# Patient Record
Sex: FEMALE | Race: WHITE | NOT HISPANIC OR LATINO | URBAN - METROPOLITAN AREA
[De-identification: names, ages, dates, MRNs, and addresses within clinical notes are randomized per-mention and may not be internally consistent; named-entity substitution may affect disease eponyms.]

---

## 2025-06-27 ENCOUNTER — APPOINTMENT (OUTPATIENT)
Dept: NEUROSURGERY | Facility: CLINIC | Age: 80
End: 2025-06-27
Payer: MEDICARE

## 2025-06-27 VITALS
HEART RATE: 75 BPM | BODY MASS INDEX: 28.55 KG/M2 | WEIGHT: 136 LBS | HEIGHT: 58 IN | DIASTOLIC BLOOD PRESSURE: 84 MMHG | TEMPERATURE: 97.7 F | SYSTOLIC BLOOD PRESSURE: 126 MMHG

## 2025-06-27 DIAGNOSIS — M47.816 LUMBAR SPONDYLOSIS: Primary | ICD-10-CM

## 2025-06-27 DIAGNOSIS — M54.16 LUMBAR RADICULOPATHY: ICD-10-CM

## 2025-06-27 PROCEDURE — 1125F AMNT PAIN NOTED PAIN PRSNT: CPT | Performed by: NURSE PRACTITIONER

## 2025-06-27 PROCEDURE — 99204 OFFICE O/P NEW MOD 45 MIN: CPT | Performed by: NURSE PRACTITIONER

## 2025-06-27 ASSESSMENT — PATIENT HEALTH QUESTIONNAIRE - PHQ9
2. FEELING DOWN, DEPRESSED OR HOPELESS: NOT AT ALL
SUM OF ALL RESPONSES TO PHQ9 QUESTIONS 1 AND 2: 1
1. LITTLE INTEREST OR PLEASURE IN DOING THINGS: SEVERAL DAYS

## 2025-06-27 ASSESSMENT — ENCOUNTER SYMPTOMS: OCCASIONAL FEELINGS OF UNSTEADINESS: 1

## 2025-06-27 ASSESSMENT — PAIN SCALES - GENERAL: PAINLEVEL_OUTOF10: 6

## 2025-06-30 NOTE — PROGRESS NOTES
[facility] [place]  Department of Neurological Surgery  New Patient Visit    History of Present Illness:     Stephanie Jeffers is a 79 y.o. year old female who presents to the spine clinic with left lower extremity pain.  Patient is well-established with Dr. Pérez Ramachandran and the [facility] pain management group in August 2024 she had a left-sided L5-S1 epidural spinal injection which completely resolved her symptoms at that time.  She is continue to follow with him and is managed on tramadol and on 04/01/2025 she underwent an ablation procedure on L3-L5 which initially reduced her pain but the relief only lasted for a day therefore at the beginning of May 2025 an injection was provided to the patient that did not provide her any relief.  Imaging done in their office revealed that L4 was pushing into L5 with complete loss of L5-S1 disc space and she comes in today to discuss options.    Chief Complaint   Patient presents with    New Patient Visit     Patient is complaining of constant low back, bilateral buttock, and bilateral leg pain      14/14 systems reviewed and negative other than what is listed in the history of present illness  Problem List[1]  Medical History[2]  Surgical History[3]  Social History     Tobacco Use    Smoking status: Never    Smokeless tobacco: Never   Substance Use Topics    Alcohol use: Yes     Comment: Socially     family history is not on file.  Current Medications[4]  Allergies[5]    Physical Examination:     General: Well developed, awake/alert/oriented x3, no distress, alert and cooperative  Skin: Warm and dry, no lesions, no rashes  ENMT: Mucous membranes moist, no apparent injury, no lesions seen  Head/Neck: Neck Supple, no apparent injury  Respiratory/Thorax: Normal breath sounds with good chest expansion, thorax symmetric  Cardiovascular: No pitting edema, no JVD    Motor Strength: 5/5 Throughout bilateral upper extremities and bilateral lower extremities    Muscle  Bulk: Normal and symmetric in all extremities     Paraspinal muscle spasm/tenderness present    Midline tenderness present L3-L5    Sensation to light touch intact    Results:     No imaging to review this visit    Assessment and Plan:     Stephanie Jeffers is a 79 y.o. year old female who presents to the spine clinic with left lower extremity pain.  Patient is well-established with Dr. Pérez Ramachandran and the [facility] pain management group in August 2024 she had a left-sided L5-S1 epidural spinal injection which completely resolved her symptoms at that time.  She is continue to follow with him and is managed on tramadol and on 04/01/2025 she underwent an ablation procedure on L3-L5 which initially reduced her pain but the relief only lasted for a day therefore at the beginning of May 2025 an injection was provided to the patient that did not provide her any relief.  Imaging done in their office revealed that L4 was pushing into L5 with complete loss of L5-S1 disc space and she comes in today to discuss options.    Physical exam the patient is alert and orient x 3.  She has 5/5 strength in all 4 extremities.  No focal deficits on neurologic exam.  She has midline tenderness at L3-L5 with radiation into bilateral immediate paraspinal musculature.  She denies any pain in her bilateral thighs but does endorse pain along the lateral portion of her left lower extremity from her knee extending into her ankle, this does not extend into her foot..  She also was noted to have a old slow healing bruise on her right hip that she sustained when she fell out of bed and bumped it on a footstool a few weeks back.  Patient ambulates with the assistance of a cane.  She denies any loss of bowel or bladder control or saddle anesthesia.    Since patient did have partial relief of her symptoms and imaging at Dr. Ramachandran's office suggests structural deformity.  I would like to get an updated MRI to better evaluate her soft tissue and nerve  involvement.  If there is nothing that explains her pain would consider an EMG of the left lower extremity to better evaluate her nerve involvement and to see if there is a superimposed neuropathy.  Patient agrees with this plan and will obtain the imaging.  To note the patient did have a consult with Dr. Quigley a few years back who suggested that she may require surgical intervention in the future but that he advised against it due to her age and comorbidities.  I discussed this with the patient and she is not currently interested in any surgical interventions.  I discussed with her that we would like to continue to exhaust all nonsurgical interventions at this time and manage her conservatively if possible.  She will obtain the imaging and I will contact her with the results.  In the meantime I reviewed red flag symptoms with the patient as well as how when to seek emergency medical care.  She will contact the office with any questions or concerns.      Samantha Meeson, MSN, NP-C  Adult-Gerontology Associate Nurse Practitioner  Department of Neurosurgery- Spine [place]  Main phone [phone]  Fax [phone]         [1] There is no problem list on file for this patient.  [2]   Past Medical History:  Diagnosis Date    Asymptomatic menopausal state 09/10/2014    Menopause    Encounter for screening for malignant neoplasm of colon     Screening for colon cancer    Nephrotic syndrome with unspecified morphologic changes 09/10/2014    Nephrosis    Personal history of other infectious and parasitic diseases 09/10/2014    History of pertussis   [3]   Past Surgical History:  Procedure Laterality Date    HYSTERECTOMY  09/10/2014    Hysterectomy    MOUTH SURGERY  02/04/2016    Oral Surgery Tooth Extraction    ROTATOR CUFF REPAIR  09/10/2014    Rotator Cuff Repair   [4] No current outpatient medications on file.  [5]   Allergies  Allergen Reactions    Codeine Nausea Only

## 2025-07-31 ENCOUNTER — APPOINTMENT (OUTPATIENT)
Dept: NEUROSURGERY | Facility: CLINIC | Age: 80
End: 2025-07-31
Payer: MEDICARE

## 2025-07-31 VITALS
SYSTOLIC BLOOD PRESSURE: 142 MMHG | TEMPERATURE: 96.8 F | HEART RATE: 109 BPM | WEIGHT: 136 LBS | HEIGHT: 58 IN | BODY MASS INDEX: 28.55 KG/M2 | DIASTOLIC BLOOD PRESSURE: 90 MMHG

## 2025-07-31 DIAGNOSIS — M47.816 LUMBAR SPONDYLOSIS: ICD-10-CM

## 2025-07-31 DIAGNOSIS — M54.16 LUMBAR RADICULOPATHY: ICD-10-CM

## 2025-07-31 DIAGNOSIS — M41.20 OTHER IDIOPATHIC SCOLIOSIS, UNSPECIFIED SPINAL REGION: Primary | ICD-10-CM

## 2025-07-31 PROCEDURE — 1125F AMNT PAIN NOTED PAIN PRSNT: CPT | Performed by: NURSE PRACTITIONER

## 2025-07-31 PROCEDURE — 1036F TOBACCO NON-USER: CPT | Performed by: NURSE PRACTITIONER

## 2025-07-31 PROCEDURE — 99214 OFFICE O/P EST MOD 30 MIN: CPT | Performed by: NURSE PRACTITIONER

## 2025-07-31 PROCEDURE — 1159F MED LIST DOCD IN RCRD: CPT | Performed by: NURSE PRACTITIONER

## 2025-07-31 RX ORDER — ALBUTEROL SULFATE 90 UG/1
INHALANT RESPIRATORY (INHALATION)
COMMUNITY

## 2025-07-31 RX ORDER — BUSPIRONE HYDROCHLORIDE 5 MG/1
5 TABLET ORAL 2 TIMES DAILY
COMMUNITY
Start: 2016-12-01

## 2025-07-31 RX ORDER — TRAMADOL HYDROCHLORIDE 50 MG/1
50 TABLET, FILM COATED ORAL
COMMUNITY
Start: 2025-05-19 | End: 2025-09-30

## 2025-07-31 ASSESSMENT — PAIN SCALES - GENERAL: PAINLEVEL_OUTOF10: 7

## 2025-07-31 ASSESSMENT — PATIENT HEALTH QUESTIONNAIRE - PHQ9
2. FEELING DOWN, DEPRESSED OR HOPELESS: NOT AT ALL
1. LITTLE INTEREST OR PLEASURE IN DOING THINGS: NOT AT ALL
SUM OF ALL RESPONSES TO PHQ9 QUESTIONS 1 AND 2: 0

## 2025-07-31 NOTE — PROGRESS NOTES
[facility] [place]  Department of Neurological Surgery  Established Patient Visit    History of Present Illness:     Stephanie Jeffers is a 79 y.o. year old female who returns to the spine clinic to review imaging and discuss next steps.  I last saw her on 06/27/2025 she presented at the time with left lower extremity pain.  As previously stated the patient is well-established with Dr. Pérez Ramachandran and the [facility] pain management group and has received multiple injections and ablations.  In August 2024 she had a left-sided L5-S1 epidural spinal injection which completely resolved her symptoms at that time.  She has continued to follow with him and is managed on tramadol and on 04/01/2025 she underwent an ablation procedure on L3-L5 which initially reduced her pain but the relief only lasted for a day therefore at the beginning of May 2025 an injection was provided to the patient that did not provide her any relief.  Imaging done in their office revealed that L4 was pushing into L5 with complete loss of L5-S1 disc space therefore she comes in to discuss options.  She obtained an MRI and x-ray of the lumbar spine and is here today to review results.      Chief Complaint   Patient presents with    Follow-up     Patient is complaining of constant low back pain radiating into both buttocks and down both legs, and neck pain.      14/14 systems reviewed and negative other than what is listed in the history of present illness  Problem List[1]  Medical History[2]  Surgical History[3]  Social History     Tobacco Use    Smoking status: Never    Smokeless tobacco: Never   Substance Use Topics    Alcohol use: Yes     Comment: Socially     family history is not on file.  Current Medications[4]  Allergies[5]    Physical Examination:     General: Well developed, awake/alert/oriented x3, no distress, alert and cooperative  Skin: Warm and dry, no lesions, no rashes  ENMT: Mucous membranes moist, no apparent injury, no  lesions seen  Head/Neck: Neck Supple, no apparent injury  Respiratory/Thorax: Normal breath sounds with good chest expansion, thorax symmetric  Cardiovascular: No pitting edema, no JVD    Motor Strength: 5/5 Throughout bilateral upper extremities and bilateral lower extremities    Muscle Bulk: Normal and symmetric in all extremities     Paraspinal muscle spasm/tenderness present    Midline tenderness present    Sensation to light touch intact    Results:     I personally reviewed and interpreted the imaging results which included lumbar x-ray from 06/30/2025 which demonstrated stability with flexion and extension as well as a leftward scoliotic curvature.  She has anterolisthesis of L4 on 5 and L5 on S1.  Her MRI from 07/11/2025 was also reviewed which again demonstrates her leftward scoliotic curve and anterolisthesis of L4 on 5 L5 on S1.  She has significant degenerative disc disease.  She has pain levels of mild to severe foraminal stenosis from L2-S1 most severe at L4-5 and 5 S1.  She also has severe central canal stenosis at L4-5 and moderate central canal stenosis at L3-4.    Assessment and Plan:       Patient is well-established with Dr. Pérez Ramachandran and the [facility] pain management group in August 2024 she had a left-sided L5-S1 epidural spinal injection which completely resolved her symptoms at that time.  She is continue to follow with him and is managed on tramadol and on 04/01/2025 she underwent an ablation procedure on L3-L5 which initially reduced her pain but the relief only lasted for a day therefore at the beginning of May 2025 an injection was provided to the patient that did not provide her any relief.  Imaging done in their office revealed that L4 was pushing into L5 with complete loss of L5-S1 disc space and she comes in today to discuss options.     Physical exam the patient is alert and orient x 3.  She has 5/5 strength in all 4 extremities.  No focal deficits on neurologic exam.  She has  midline tenderness at L3-L5 with radiation into bilateral immediate paraspinal musculature.  She endorses pain along the lateral portion of her bilateral lower extremities from her knees extending into her ankles, this does not extend into her foot.  She also states that she has significant pain into her right buttock and posterior thigh.  Previously noted bruising is now resolved.  Patient ambulates with the assistance of a cane and has a notable scoliotic curvature.  She denies any loss of bowel or bladder control or saddle anesthesia.     She had her lumbar x-ray and MRI performed at [facility] and images were pushed to PACS for my review.  I personally reviewed her lumbar x-ray from 06/30/2025 which demonstrated stability with flexion and extension as well as a leftward scoliotic curvature.  She has anterolisthesis of L4 on 5 and L5 on S1.  Her MRI from 07/11/2025 was also reviewed which again demonstrates her leftward scoliotic curve and anterolisthesis of L4 on 5 L5 on S1.  She has significant degenerative disc disease.  She has pain levels of mild to severe foraminal stenosis from L2-S1 most severe at L4-5 and 5 S1.  She also has severe central canal stenosis at L4-5 and moderate central canal stenosis at L3-4.    I discussed with the patient that any surgeries offered would most likely be invasive and potentially multilevel which would increase her risk factors.  We discussed if she is approaching the age of 80 that we have to consider a risk-benefit analysis.  As previously mentioned she had a consult with Dr. Quigley a few years back who suggested that she may require surgical intervention in the future but that he advised against it due to her age and comorbidities.  I discussed this with the patient and she again is not currently interested in any surgical interventions.  I discussed with her that we would like to continue to exhaust all nonsurgical interventions at this time and manage her conservatively.   I am referring her over to [facility] for physical therapy specific for the Schroth method to help with her scoliotic curvature.  She will also [place] back with Dr. Ramachandran to evaluate for L5-S1 injection versus ablation.  He will also continue to manage her tramadol.  At this point in time the patient will follow-up with me as needed.  I reviewed red flag symptoms with the patient as well as how when to seek emergency medical care.  She will contact the office with any questions or concerns.    Samantha Meeson, MSN, NP-C  Adult-Gerontology Associate Nurse Practitioner  Department of Neurosurgery- Spine [place]  Main phone [phone]  Fax [phone]               [1] There is no problem list on file for this patient.  [2]   Past Medical History:  Diagnosis Date    Asymptomatic menopausal state 09/10/2014    Menopause    Encounter for screening for malignant neoplasm of colon     Screening for colon cancer    Nephrotic syndrome with unspecified morphologic changes 09/10/2014    Nephrosis    Personal history of other infectious and parasitic diseases 09/10/2014    History of pertussis   [3]   Past Surgical History:  Procedure Laterality Date    HYSTERECTOMY  09/10/2014    Hysterectomy    MOUTH SURGERY  02/04/2016    Oral Surgery Tooth Extraction    ROTATOR CUFF REPAIR  09/10/2014    Rotator Cuff Repair   [4]   Current Outpatient Medications:     busPIRone (Buspar) 5 mg tablet, Take 1 tablet (5 mg) by mouth twice a day., Disp: , Rfl:     traMADol (Ultram) 50 mg tablet, 1 tablet (50 mg)., Disp: , Rfl:     albuterol 90 mcg/actuation inhaler, INHALE 2 PUFFS BY MOUTH AS NEEDED EVERY 6 HOURS FOR 30 DAYS, Disp: , Rfl:   [5]   Allergies  Allergen Reactions    Codeine Nausea Only